# Patient Record
Sex: FEMALE | Race: WHITE | Employment: OTHER | URBAN - METROPOLITAN AREA
[De-identification: names, ages, dates, MRNs, and addresses within clinical notes are randomized per-mention and may not be internally consistent; named-entity substitution may affect disease eponyms.]

---

## 2017-08-16 ENCOUNTER — OFFICE VISIT (OUTPATIENT)
Dept: SURGERY | Age: 82
End: 2017-08-16

## 2017-08-16 DIAGNOSIS — L57.0 ACTINIC KERATOSIS: ICD-10-CM

## 2017-08-16 DIAGNOSIS — B07.9 VIRAL WARTS, UNSPECIFIED TYPE: ICD-10-CM

## 2017-08-16 DIAGNOSIS — L82.0 INFLAMED SEBORRHEIC KERATOSIS: ICD-10-CM

## 2017-08-16 DIAGNOSIS — R52 PAIN: ICD-10-CM

## 2017-08-16 DIAGNOSIS — Z85.828 HISTORY OF SKIN CANCER: Primary | ICD-10-CM

## 2017-08-16 PROCEDURE — 1090F PRES/ABSN URINE INCON ASSESS: CPT | Performed by: DERMATOLOGY

## 2017-08-16 PROCEDURE — 4040F PNEUMOC VAC/ADMIN/RCVD: CPT | Performed by: DERMATOLOGY

## 2017-08-16 PROCEDURE — 1123F ACP DISCUSS/DSCN MKR DOCD: CPT | Performed by: DERMATOLOGY

## 2017-08-16 PROCEDURE — G8421 BMI NOT CALCULATED: HCPCS | Performed by: DERMATOLOGY

## 2017-08-16 PROCEDURE — 1036F TOBACCO NON-USER: CPT | Performed by: DERMATOLOGY

## 2017-08-16 PROCEDURE — 17110 DESTRUCTION B9 LES UP TO 14: CPT | Performed by: DERMATOLOGY

## 2017-08-16 PROCEDURE — G8428 CUR MEDS NOT DOCUMENT: HCPCS | Performed by: DERMATOLOGY

## 2017-08-16 PROCEDURE — G8598 ASA/ANTIPLAT THER USED: HCPCS | Performed by: DERMATOLOGY

## 2017-08-16 PROCEDURE — 17000 DESTRUCT PREMALG LESION: CPT | Performed by: DERMATOLOGY

## 2017-08-16 PROCEDURE — 17003 DESTRUCT PREMALG LES 2-14: CPT | Performed by: DERMATOLOGY

## 2017-08-16 PROCEDURE — 99213 OFFICE O/P EST LOW 20 MIN: CPT | Performed by: DERMATOLOGY

## 2017-12-03 PROBLEM — R06.03 RESPIRATORY DISTRESS: Status: ACTIVE | Noted: 2017-01-01

## 2018-01-01 ENCOUNTER — APPOINTMENT (OUTPATIENT)
Dept: GENERAL RADIOLOGY | Age: 83
DRG: 285 | End: 2018-01-01
Payer: MEDICARE

## 2018-01-01 ENCOUNTER — OFFICE VISIT (OUTPATIENT)
Dept: SURGERY | Age: 83
End: 2018-01-01

## 2018-01-01 ENCOUNTER — HOSPITAL ENCOUNTER (INPATIENT)
Age: 83
LOS: 1 days | DRG: 285 | End: 2018-11-13
Attending: EMERGENCY MEDICINE | Admitting: INTERNAL MEDICINE
Payer: MEDICARE

## 2018-01-01 ENCOUNTER — APPOINTMENT (OUTPATIENT)
Dept: CT IMAGING | Age: 83
DRG: 285 | End: 2018-01-01
Payer: MEDICARE

## 2018-01-01 ENCOUNTER — TELEPHONE (OUTPATIENT)
Dept: CARDIOLOGY CLINIC | Age: 83
End: 2018-01-01

## 2018-01-01 VITALS
HEART RATE: 79 BPM | OXYGEN SATURATION: 96 % | SYSTOLIC BLOOD PRESSURE: 120 MMHG | DIASTOLIC BLOOD PRESSURE: 68 MMHG | BODY MASS INDEX: 19.14 KG/M2 | WEIGHT: 108.03 LBS | RESPIRATION RATE: 14 BRPM | TEMPERATURE: 98.5 F | HEIGHT: 63 IN

## 2018-01-01 DIAGNOSIS — L57.0 ACTINIC KERATOSIS: ICD-10-CM

## 2018-01-01 DIAGNOSIS — D48.5 NEOPLASM OF UNCERTAIN BEHAVIOR OF SKIN: ICD-10-CM

## 2018-01-01 DIAGNOSIS — L82.1 SEBORRHEIC KERATOSIS: Primary | ICD-10-CM

## 2018-01-01 DIAGNOSIS — B35.1 ONYCHOMYCOSIS: ICD-10-CM

## 2018-01-01 DIAGNOSIS — I21.3 ST ELEVATION MYOCARDIAL INFARCTION (STEMI), UNSPECIFIED ARTERY (HCC): Primary | ICD-10-CM

## 2018-01-01 DIAGNOSIS — L85.8 CUTANEOUS HORN: ICD-10-CM

## 2018-01-01 DIAGNOSIS — L65.9 ALOPECIA OF SCALP: ICD-10-CM

## 2018-01-01 LAB
ANION GAP SERPL CALCULATED.3IONS-SCNC: 14 MMOL/L (ref 3–16)
ANTI-XA UNFRAC HEPARIN: 0.18 IU/ML (ref 0.3–0.7)
ANTI-XA UNFRAC HEPARIN: 1.17 IU/ML (ref 0.3–0.7)
APTT: 34.3 SEC (ref 26–36)
BASOPHILS ABSOLUTE: 0.1 K/UL (ref 0–0.2)
BASOPHILS RELATIVE PERCENT: 0.4 %
BUN BLDV-MCNC: 26 MG/DL (ref 7–20)
CALCIUM IONIZED: 1.24 MMOL/L (ref 1.12–1.32)
CALCIUM SERPL-MCNC: 10.2 MG/DL (ref 8.3–10.6)
CHLORIDE BLD-SCNC: 103 MMOL/L (ref 99–110)
CO2: 26 MMOL/L (ref 21–32)
CO2: 26 MMOL/L (ref 21–32)
CREAT SERPL-MCNC: 1 MG/DL (ref 0.6–1.2)
EOSINOPHILS ABSOLUTE: 0 K/UL (ref 0–0.6)
EOSINOPHILS RELATIVE PERCENT: 0 %
GFR AFRICAN AMERICAN: >60
GFR AFRICAN AMERICAN: >60
GFR NON-AFRICAN AMERICAN: 52
GFR NON-AFRICAN AMERICAN: 52
GLUCOSE BLD-MCNC: 158 MG/DL (ref 70–99)
GLUCOSE BLD-MCNC: 162 MG/DL (ref 70–99)
HCT VFR BLD CALC: 41 % (ref 36–48)
HEMOGLOBIN: 13.6 G/DL (ref 12–16)
INR BLD: 0.97 (ref 0.86–1.14)
LV EF: 33 %
LVEF MODALITY: NORMAL
LYMPHOCYTES ABSOLUTE: 0.6 K/UL (ref 1–5.1)
LYMPHOCYTES RELATIVE PERCENT: 4.4 %
MAGNESIUM: 1.9 MG/DL (ref 1.8–2.4)
MCH RBC QN AUTO: 30.8 PG (ref 26–34)
MCHC RBC AUTO-ENTMCNC: 33.1 G/DL (ref 31–36)
MCV RBC AUTO: 93.1 FL (ref 80–100)
MONOCYTES ABSOLUTE: 1 K/UL (ref 0–1.3)
MONOCYTES RELATIVE PERCENT: 7.2 %
NEUTROPHILS ABSOLUTE: 12.5 K/UL (ref 1.7–7.7)
NEUTROPHILS RELATIVE PERCENT: 88 %
PDW BLD-RTO: 13.9 % (ref 12.4–15.4)
PERFORMED ON: ABNORMAL
PERFORMED ON: ABNORMAL
PLATELET # BLD: 130 K/UL (ref 135–450)
PMV BLD AUTO: 8.6 FL (ref 5–10.5)
POC ANION GAP: 14 (ref 10–20)
POC BUN: 25 MG/DL (ref 7–18)
POC CHLORIDE: 104 MMOL/L (ref 99–110)
POC CREATININE: 1 MG/DL (ref 0.6–1.2)
POC POTASSIUM: 3.8 MMOL/L (ref 3.5–5.1)
POC SAMPLE TYPE: ABNORMAL
POC SAMPLE TYPE: ABNORMAL
POC SODIUM: 144 MMOL/L (ref 136–145)
POC TROPONIN I: >35 NG/ML (ref 0–0.1)
POTASSIUM REFLEX MAGNESIUM: 4.1 MMOL/L (ref 3.5–5.1)
PRO-BNP: ABNORMAL PG/ML (ref 0–449)
PROTHROMBIN TIME: 11.1 SEC (ref 9.8–13)
RBC # BLD: 4.41 M/UL (ref 4–5.2)
SODIUM BLD-SCNC: 143 MMOL/L (ref 136–145)
TROPONIN: 6.3 NG/ML
TROPONIN: 7.53 NG/ML
TROPONIN: 8.43 NG/ML
WBC # BLD: 14.2 K/UL (ref 4–11)

## 2018-01-01 PROCEDURE — 99221 1ST HOSP IP/OBS SF/LOW 40: CPT | Performed by: NURSE PRACTITIONER

## 2018-01-01 PROCEDURE — 93005 ELECTROCARDIOGRAM TRACING: CPT | Performed by: EMERGENCY MEDICINE

## 2018-01-01 PROCEDURE — 4040F PNEUMOC VAC/ADMIN/RCVD: CPT | Performed by: DERMATOLOGY

## 2018-01-01 PROCEDURE — 1036F TOBACCO NON-USER: CPT | Performed by: DERMATOLOGY

## 2018-01-01 PROCEDURE — 6360000002 HC RX W HCPCS

## 2018-01-01 PROCEDURE — 6360000004 HC RX CONTRAST MEDICATION: Performed by: INTERNAL MEDICINE

## 2018-01-01 PROCEDURE — 83880 ASSAY OF NATRIURETIC PEPTIDE: CPT

## 2018-01-01 PROCEDURE — 6370000000 HC RX 637 (ALT 250 FOR IP): Performed by: EMERGENCY MEDICINE

## 2018-01-01 PROCEDURE — 99291 CRITICAL CARE FIRST HOUR: CPT | Performed by: INTERNAL MEDICINE

## 2018-01-01 PROCEDURE — 1200000000 HC SEMI PRIVATE

## 2018-01-01 PROCEDURE — 17000 DESTRUCT PREMALG LESION: CPT | Performed by: DERMATOLOGY

## 2018-01-01 PROCEDURE — 6370000000 HC RX 637 (ALT 250 FOR IP): Performed by: STUDENT IN AN ORGANIZED HEALTH CARE EDUCATION/TRAINING PROGRAM

## 2018-01-01 PROCEDURE — 93306 TTE W/DOPPLER COMPLETE: CPT

## 2018-01-01 PROCEDURE — 71045 X-RAY EXAM CHEST 1 VIEW: CPT

## 2018-01-01 PROCEDURE — 80047 BASIC METABLC PNL IONIZED CA: CPT

## 2018-01-01 PROCEDURE — 70498 CT ANGIOGRAPHY NECK: CPT

## 2018-01-01 PROCEDURE — 85018 HEMOGLOBIN: CPT

## 2018-01-01 PROCEDURE — 99214 OFFICE O/P EST MOD 30 MIN: CPT | Performed by: DERMATOLOGY

## 2018-01-01 PROCEDURE — 6360000002 HC RX W HCPCS: Performed by: EMERGENCY MEDICINE

## 2018-01-01 PROCEDURE — 99233 SBSQ HOSP IP/OBS HIGH 50: CPT | Performed by: INTERNAL MEDICINE

## 2018-01-01 PROCEDURE — 85730 THROMBOPLASTIN TIME PARTIAL: CPT

## 2018-01-01 PROCEDURE — 84484 ASSAY OF TROPONIN QUANT: CPT

## 2018-01-01 PROCEDURE — G8427 DOCREV CUR MEDS BY ELIG CLIN: HCPCS | Performed by: DERMATOLOGY

## 2018-01-01 PROCEDURE — G8598 ASA/ANTIPLAT THER USED: HCPCS | Performed by: DERMATOLOGY

## 2018-01-01 PROCEDURE — 1090F PRES/ABSN URINE INCON ASSESS: CPT | Performed by: DERMATOLOGY

## 2018-01-01 PROCEDURE — G8420 CALC BMI NORM PARAMETERS: HCPCS | Performed by: DERMATOLOGY

## 2018-01-01 PROCEDURE — 83735 ASSAY OF MAGNESIUM: CPT

## 2018-01-01 PROCEDURE — C8929 TTE W OR WO FOL WCON,DOPPLER: HCPCS

## 2018-01-01 PROCEDURE — 85610 PROTHROMBIN TIME: CPT

## 2018-01-01 PROCEDURE — APPSS45 APP SPLIT SHARED TIME 31-45 MINUTES: Performed by: NURSE PRACTITIONER

## 2018-01-01 PROCEDURE — 1101F PT FALLS ASSESS-DOCD LE1/YR: CPT | Performed by: DERMATOLOGY

## 2018-01-01 PROCEDURE — 99285 EMERGENCY DEPT VISIT HI MDM: CPT

## 2018-01-01 PROCEDURE — 96374 THER/PROPH/DIAG INJ IV PUSH: CPT

## 2018-01-01 PROCEDURE — 36415 COLL VENOUS BLD VENIPUNCTURE: CPT

## 2018-01-01 PROCEDURE — 70450 CT HEAD/BRAIN W/O DYE: CPT

## 2018-01-01 PROCEDURE — 2580000003 HC RX 258: Performed by: STUDENT IN AN ORGANIZED HEALTH CARE EDUCATION/TRAINING PROGRAM

## 2018-01-01 PROCEDURE — 1123F ACP DISCUSS/DSCN MKR DOCD: CPT | Performed by: DERMATOLOGY

## 2018-01-01 PROCEDURE — 70496 CT ANGIOGRAPHY HEAD: CPT

## 2018-01-01 PROCEDURE — 85025 COMPLETE CBC W/AUTO DIFF WBC: CPT

## 2018-01-01 PROCEDURE — 85520 HEPARIN ASSAY: CPT

## 2018-01-01 RX ORDER — HEPARIN SODIUM 10000 [USP'U]/100ML
11 INJECTION, SOLUTION INTRAVENOUS CONTINUOUS
Status: DISCONTINUED | OUTPATIENT
Start: 2018-01-01 | End: 2018-11-14 | Stop reason: HOSPADM

## 2018-01-01 RX ORDER — HEPARIN SODIUM 5000 [USP'U]/ML
60 INJECTION, SOLUTION INTRAVENOUS; SUBCUTANEOUS PRN
Status: DISCONTINUED | OUTPATIENT
Start: 2018-01-01 | End: 2018-11-14 | Stop reason: HOSPADM

## 2018-01-01 RX ORDER — HEPARIN SODIUM 5000 [USP'U]/ML
60 INJECTION, SOLUTION INTRAVENOUS; SUBCUTANEOUS ONCE
Status: COMPLETED | OUTPATIENT
Start: 2018-01-01 | End: 2018-01-01

## 2018-01-01 RX ORDER — HEPARIN SODIUM 10000 [USP'U]/100ML
INJECTION, SOLUTION INTRAVENOUS
Status: COMPLETED
Start: 2018-01-01 | End: 2018-01-01

## 2018-01-01 RX ORDER — ONDANSETRON 2 MG/ML
4 INJECTION INTRAMUSCULAR; INTRAVENOUS EVERY 6 HOURS PRN
Status: DISCONTINUED | OUTPATIENT
Start: 2018-01-01 | End: 2018-11-14 | Stop reason: HOSPADM

## 2018-01-01 RX ORDER — ASPIRIN 300 MG/1
300 SUPPOSITORY RECTAL DAILY
Status: DISCONTINUED | OUTPATIENT
Start: 2018-01-01 | End: 2018-11-14 | Stop reason: HOSPADM

## 2018-01-01 RX ORDER — ASPIRIN 300 MG/1
300 SUPPOSITORY RECTAL ONCE
Status: COMPLETED | OUTPATIENT
Start: 2018-01-01 | End: 2018-01-01

## 2018-01-01 RX ORDER — HEPARIN SODIUM 5000 [USP'U]/ML
30 INJECTION, SOLUTION INTRAVENOUS; SUBCUTANEOUS PRN
Status: DISCONTINUED | OUTPATIENT
Start: 2018-01-01 | End: 2018-11-14 | Stop reason: HOSPADM

## 2018-01-01 RX ORDER — HEPARIN SODIUM 5000 [USP'U]/ML
INJECTION, SOLUTION INTRAVENOUS; SUBCUTANEOUS
Status: DISPENSED
Start: 2018-01-01 | End: 2018-01-01

## 2018-01-01 RX ORDER — SODIUM CHLORIDE 0.9 % (FLUSH) 0.9 %
10 SYRINGE (ML) INJECTION PRN
Status: DISCONTINUED | OUTPATIENT
Start: 2018-01-01 | End: 2018-11-14 | Stop reason: HOSPADM

## 2018-01-01 RX ORDER — ACETAMINOPHEN 650 MG/1
650 SUPPOSITORY RECTAL EVERY 4 HOURS PRN
Status: DISCONTINUED | OUTPATIENT
Start: 2018-01-01 | End: 2018-11-14 | Stop reason: HOSPADM

## 2018-01-01 RX ORDER — SODIUM CHLORIDE 0.9 % (FLUSH) 0.9 %
10 SYRINGE (ML) INJECTION EVERY 12 HOURS SCHEDULED
Status: DISCONTINUED | OUTPATIENT
Start: 2018-01-01 | End: 2018-11-14 | Stop reason: HOSPADM

## 2018-01-01 RX ADMIN — HEPARIN SODIUM AND DEXTROSE 12 UNITS/KG/HR: 10000; 5 INJECTION INTRAVENOUS at 00:28

## 2018-01-01 RX ADMIN — HEPARIN SODIUM 3000 UNITS: 5000 INJECTION INTRAVENOUS; SUBCUTANEOUS at 00:24

## 2018-01-01 RX ADMIN — DEXTROSE AND SODIUM CHLORIDE: 5; 450 INJECTION, SOLUTION INTRAVENOUS at 16:46

## 2018-01-01 RX ADMIN — HEPARIN SODIUM 1500 UNITS: 5000 INJECTION INTRAVENOUS; SUBCUTANEOUS at 16:46

## 2018-01-01 RX ADMIN — IOPAMIDOL 80 ML: 755 INJECTION, SOLUTION INTRAVENOUS at 17:24

## 2018-01-01 RX ADMIN — ASPIRIN 300 MG: 300 SUPPOSITORY RECTAL at 00:22

## 2018-01-01 RX ADMIN — ASPIRIN 300 MG: 300 SUPPOSITORY RECTAL at 11:16

## 2018-01-01 RX ADMIN — HEPARIN SODIUM 12 UNITS/KG/HR: 10000 INJECTION, SOLUTION INTRAVENOUS at 00:28

## 2018-08-09 NOTE — PROGRESS NOTES
[facility]) Mohs Surgery and Cosmetic Dermatology  Lavon Lawson MD  [phone]      Redd Escalante  2/4/1930    80 y.o. female     Date of Visit: 8/8/2018    Chief Complaint:   Chief Complaint   Patient presents with    Other     Skin check, yearly gen derm        CC:   Crusted lesion on left third toe    History of Present Illness:  1. Pt's son c/o crusted lesion on left third toe. Pt has no complaints. No increase in size for the past year. Present for many years without change. No tx tried  2. C/o thickened left great toenail. Asx. No tx tried. 3. C/o skin lesion on left arm. Asx. No tx tried. Present for several months  4. C/o scaly lesion on nasal dorsum, at least several months, asx, no tx tried. 5. C/o hair loss. Gradual thinning. No clumps of hair falling out. No bald patches. No itching or redness of scalp. According to son, pt has had thyroid checked and no disturbances. Results from 12/17 wnl  6. C/o discoloration on lower legs for several years, possibly worsening. No tx tried. No h/o sig edema. History of skin cancer:  Yes, NMSC  Family history of Melanoma: none    Review of Systems:  Constitutional: Reports general sense of well-being. Skin: Denies any other new or changing moles. no tendency to develop thick scars. Heme: no abnormal bleeding/bruising. Past Medical History:   Diagnosis Date    Altered mental status 4/28/2015    Assoc w/ intercurent illness: UTI with >10,000 CFU Klebsiella resistant to nitrofurantoin seen on 4/17/15 urine C&S done at [facility] (where [facility]  by Will Moore MD was done on 4/22/15 revealing benign endometrial hyperplasia without atypia) is fully sensitive to cipro. This Klebsiella is certainly etiologic w/ regard to her current UTI 4/27/15.     Aphasia 7/5/2016    Lack of verbal responsiveness on equivocally new since outpatient exam Thursday 6/30/16 when patient was fluently conversant (though still demented, verbal interaction during raised papule - son reports a recent trauma there. Assessment and Plan       1. Seborrheic keratosis toe: Counseling:  Seborrheic keratosis are benign. No treatment is necessary. Treatment options include liquid nitrogen, electrodessication and shave removal.  Expectations:  Seborrheic keratosis are benign growths. They tend to increase in number with age. 2. Onychomycosis:  Given asx nature, would not recommend tx.  3. Cutaneous horn left arm:  Low likelihood of underlying nmsc given appearance. Monitor lesion and if increase in size, would recommend bx.  4. Actinic keratosis nose:   Liquid nitrogen cryotherapy for actinic keratosis X 1 on nasal dorsum. After risk associated with the procedure including but not limited to pain, bleeding, infection, blister formation, dyspigmention, scar formation and possible need for another treatment at a later date discussed with the patient, verbal (or written) consent was obtained with teach back. Area was treated with liquid nitrogen cryotherapy in 2 freeze-thaw cycles to 1 lesion(s). Patient tolerated the procedure well, there were no immediate complications, no blood loss, after care discussed with the patient. Patient left the unit in stable good medical condition. Patient will follow up as needed. 5. Alopecia: hair seems brittle. No pathologic condition identified. Likely age related. 6. Neoplasm of uncertain behavior: Lesion on left leg - recent trauma. If persists, rtc for reevaluation.

## 2018-11-13 PROBLEM — I21.02 ST ELEVATION MYOCARDIAL INFARCTION INVOLVING LEFT ANTERIOR DESCENDING (LAD) CORONARY ARTERY (HCC): Status: ACTIVE | Noted: 2018-01-01

## 2018-11-13 PROBLEM — I21.3 STEMI (ST ELEVATION MYOCARDIAL INFARCTION) (HCC): Status: ACTIVE | Noted: 2018-01-01

## 2018-11-13 PROBLEM — I25.3 ANEURYSM OF LEFT VENTRICLE OF HEART: Status: ACTIVE | Noted: 2018-01-01

## 2018-11-13 PROBLEM — I50.21 ACUTE SYSTOLIC CONGESTIVE HEART FAILURE (HCC): Status: ACTIVE | Noted: 2018-01-01

## 2018-11-13 NOTE — ED TRIAGE NOTES
Patient is an 80year old female who presents to the ED with her son from home with reports of altered mental status and emesis. Son reports patient has not been responsive today. EKG obtained; code STEMI called; MD at bedside. PIV placed, lab work drawn and patient placed on cardiac monitor.

## 2018-11-13 NOTE — CONSULTS
suspected since 2012: Dementia. Ms. Gomes's neurologic exam shows evidence of substa    Fracture of pelvis ([address] 05/2012    R side    Heart murmur     HTN (hypertension) 12/13/2012    Hyperlipidemia LDL goal <70 7/5/2012    Secondary atherosclerosis prevention lipid therapy LDL goal <70 as part of cardiovascular risk modification in an effort to prevent recurrent CVA, preserve cognitive function by preventing multi-infarct dementia from worsening.  Labyrinthitis 03/2001    w/ vertigo, later R hand weakness    Recurrent UTI (urinary tract infection) 4/28/2015    UTI with >10,000 CFU Klebsiella resistant to nitrofurantoin seen on 4/17/15 urine C&S done at [facility] [place] (where [facility]  by Charis Knapp MD was done on 4/22/15 revealing benign endometrial hyperplasia without atypia) is fully sensitive to cipro. This Klebsiella is certainly etiologic w/ regard to her current UTI 4/27/15. 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 Peguero [facility]   [address] [facility]  06/2001    extensive n. release surg w/ Dr Memo Pringle @ [facility]. R Hand weakness coincided w/ viral labyrinthitis illness 03.2001    OTHER SURGICAL HISTORY Right 09/2010    excision ankle granuloma.  Dr. Tyron Boone  08/2001       Current Medications:    Current Facility-Administered Medications: heparin (porcine) injection 3,000 Units, 60 Units/kg, Intravenous, PRN  heparin (porcine) injection 1,500 Units, 30 Units/kg, Intravenous, PRN  heparin 25,000 units in dextrose 5% 250 mL infusion, 9 Units/kg/hr, Intravenous, Continuous  sodium chloride flush 0.9 % injection 10 mL, 10 mL, Intravenous, 2 times per day  sodium chloride flush 0.9 % injection 10 mL, 10 mL, Intravenous, PRN  magnesium hydroxide (MILK OF MAGNESIA) 400 MG/5ML suspension 30 mL, 30 mL, Oral, Daily PRN  ondansetron (ZOFRAN) injection 4 mg, 4 mg, Intravenous, Q6H PRN  acetaminophen (TYLENOL) suppository 650 mg, 650 mg, Rectal, Q4H PRN  aspirin suppository 300 mg, 300 mg, Rectal, Daily  [START ON 11/14/2018] influenza quadrivalent split vaccine (FLUZONE;FLUARIX;FLULAVAL;AFLURIA) injection 0.5 mL, 0.5 mL, Intramuscular, Once    Allergies:  Patient has no known allergies. Social History:    Patient currently lives with family ()    Review of Systems -   Patient lying with eyes closed    Objective:        PHYSICAL EXAM:    General appearance: appears stated age, cooperative, no distress, pale and resting  Head: Normocephalic, without obvious abnormality, atraumatic  Lungs: RR WNL, currently on 4 L NC  Heart: regular rate and rhythm  Abdomen: flat  Neurologic: SIERRA    Palliative Performance Scale:  60% ? Ambulation reduced; Significant disease; Can't do hobbies/housework; intake normal   or reduced; occasional assist; LOC full/confusion  50% ? Mainly sit/lie; Extensive disease; Can't do any work; Considerable assist; intake normal  Or reduced; LOC full/confusion  40% ? Mainly in bed; Extensive disease; Mainly assist; intake normal or reduced; occasional assist; LOC full/confusion  30% ? Bed Bound; Extensive disease; Total care; intake reduced; LOC full/confusion  20% ? Bed Bound; Extensive disease; Total care; intake minimal; Drowsy/coma  10% ? Bed Bound; Extensive disease; Total care; Mouth care only; Drowsy/coma  0 ?  Death    PPS: 30%    Vitals:    /72   Pulse 77   Temp 98 °F (36.7 °C) (Axillary)   Resp 13   Ht 5' 3\" (1.6 m)   Wt 108 lb 0.4 oz (49 kg)   SpO2 96%   BMI 19.14 kg/m²     DATA:    CBC with Differential:    Lab Results   Component Value Date    WBC 14.2 11/13/2018    RBC 4.41 11/13/2018    RBC 4.11 11/20/2015    HGB 13.6 11/13/2018    HCT 41.0 11/13/2018     11/13/2018    MCV 93.1 11/13/2018    MCH 30.8 11/13/2018    MCHC 33.1

## 2018-11-13 NOTE — PROGRESS NOTES
Patient's son defers transferring patient to specialty bed at this time, would like to wait until daytime.

## 2018-11-13 NOTE — CONSULTS
appropriate, to  consider a laboratory study and make a decision about doing cardiac  angiogram and intervention. Critical care time spent is 45 minutes.         Jaron John MD    D: 11/13/2018 1:35:34       T: 11/13/2018 4:40:16     [email or web address]  Job#: [id number]     Doc#: [id number]    CC:  MD Chika Edwards MD

## 2018-11-13 NOTE — H&P
without atypia) is fully sensitive to cipro. This Klebsiella is certainly etiologic w/ regard to her current UTI 4/27/15.  Aphasia 7/5/2016    Lack of verbal responsiveness on equivocally new since outpatient exam Thursday 6/30/16 when patient was fluently conversant (though still demented, verbal interaction during outpatient exam Thursday 6/30/16 consisted of pleasant verbal interaction though Mrs Gomes's responses to questions were inappropriate as she is not aware as to time, place, situation. She is only consistently oriented to pe    Breast CA ([facility] [address] 02/2008    Left, ER/ID +, HER-2/cl[place] neg. Dr. 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 Srikanth Marvin is not safely ambulatory at baseline, requiring assistance often in getting about her dwelling using walker, cane or sturdy objects in the home to stabilize her ambulation. Also NOT ORIENTED AT BASELINE TO PLACE TIME OR SITUATION. 5/7/13  Neurology Consult Polina Herron MD confirms presence of dementia suspected since 2012: Dementia. Ms. Gomes's neurologic exam shows evidence of substa    Fracture of pelvis ([facility] [address] 05/2012    R side    Heart murmur     HTN (hypertension) 12/13/2012    Hyperlipidemia LDL goal <70 7/5/2012    Secondary atherosclerosis prevention lipid therapy LDL goal <70 as part of cardiovascular risk modification in an effort to prevent recurrent CVA, preserve cognitive function by preventing multi-infarct dementia from worsening.  Labyrinthitis 03/2001    w/ vertigo, later R hand weakness    Recurrent UTI (urinary tract infection) 4/28/2015    UTI with >10,000 CFU Klebsiella resistant to nitrofurantoin seen on 4/17/15 urine C&S done at [facility] (where [facility]  by Teresa Deleon MD was done on 4/22/15 revealing benign endometrial hyperplasia without atypia) is fully sensitive to cipro.  This Klebsiella is

## 2018-11-13 NOTE — PROGRESS NOTES
Patient arrived to room 4462 via bed, transferred to bed in room. Patient placed on telemetry, vitals stable, son at bedside, 4eye assessment complete.

## 2018-11-13 NOTE — CONSULTS
atorvastatin (LIPITOR) 40 MG tablet 1 tab daily for cholesterol and stroke prevention. DO NOT CHANGE SIG 7/8/16  Yes Bonnie Tello MD   atenolol (TENORMIN) 50 MG tablet 1 tab daily for blood pressure. DO NOT CHANGE SIG 7/8/16  Yes Bonnie Tello MD   amLODIPine (NORVASC) 5 MG tablet 1 TAB daily for FOR BLOOD PRESSURE.  (DO NOT CHANGE SIG) 7/8/16  Yes Bonnie Tello MD   conjugated estrogens (PREMARIN) vaginal cream Place 1 g vaginally daily. Place vaginally daily. Yes Historical Provider, MD   Folic Acid-Vit L9-ZAQ H90 (FOLPLEX 2.2) 2.2-25-0.5 MG TABS 2 tablets daily, vitamins for memory. DO NOT CHANGE SIG 7/8/16   Bonnie Tello MD   alendronate (FOSAMAX) 70 MG tablet 1 tablet weekly on an empty stomach for bones. DO NOT CHANGE SIG 7/8/16   Bonnie Tello MD   losartan-hydrochlorothiazide [facility]) 100-25 MG per tablet TAKE 1 TABLET BY MOUTH DAILY FOR BLOOD PRESSURE 9/17/14   Bonnie Tello MD   calcium citrate-vitamin D (CITRICAL + D) 315-250 MG-UNIT TABS Take 1 tablet by mouth 2 times daily.       Historical Provider, MD          Current Facility-Administered Medications   Medication Dose Route Frequency Provider Last Rate Last Dose    heparin (porcine) injection 3,000 Units  60 Units/kg Intravenous PRN Alfredia Dancer, MD        heparin (porcine) injection 1,500 Units  30 Units/kg Intravenous PRN Alfredia Dancer, MD        heparin 25,000 units in dextrose 5% 250 mL infusion  9 Units/kg/hr Intravenous Continuous Bonnie Tello MD 4.5 mL/hr at 11/13/18 0725 9 Units/kg/hr at 11/13/18 0725    sodium chloride flush 0.9 % injection 10 mL  10 mL Intravenous 2 times per day Jay Velasquez MD        sodium chloride flush 0.9 % injection 10 mL  10 mL Intravenous PRN Jay Velasquez MD        magnesium hydroxide (MILK OF MAGNESIA) 400 MG/5ML suspension 30 mL  30 mL Oral Daily PRN Jay Velasquez MD        ondansetron [facility]) injection 4 mg  4 mg Intravenous Q6H PRN Jay Velasquez MD        acetaminophen (TYLENOL) suppository 650 mg  650 mg Rectal Q4H PRN Genie Vila MD        aspirin suppository 300 mg  300 mg Rectal Daily Genie Vila MD       [facility] [START ON 11/14/2018] influenza quadrivalent split vaccine (FLUZONE;FLUARIX;FLULAVAL;AFLURIA) injection 0.5 mL  0.5 mL Intramuscular Once Magdalena Machado MD         Allergies:  Patient has no known allergies. · ROS:   · Cardiovascular: Reviewed in HPI  · Allergic/Immunologic: No nasal congestion or hives. · All other ROS are reviewed and are unremarkable. Vitals:    11/13/18 0349 11/13/18 0700 11/13/18 0800 11/13/18 0900   BP: 132/68 125/72 121/79 120/72   Pulse: 71 76 73 77   Resp: 16 14 15 13   Temp: 98.1 °F (36.7 °C)  98 °F (36.7 °C)    TempSrc:   Axillary    SpO2:       Weight:       Height:            Constitutional and General Appearance:  Skin neg     HEENT: eyes and ears intact. THYROID: not enlarged  LUNGS decreased post bases   HEART and VASCULAR: RRR  EXTREMITIES  ecchymosis <1+ edema   ABDOMEN:neg  Neuro neg  Psych neg       Assessment    Acute ant septal MI   She has been assessed by Dr. Jacky Alan per his note  \"Spoke with patient's son this morning at bedside. He is requesting a second opinion from cardiology regarding the cardiac cath for her STEMI. Dr. Jacky Alan decided last night that risk-benefit of reperfusion strongly favors conservative management. She was started on a heparin gtt and ASA. Dr. Jacky Alan had multiple conversations with the son, Dr. Madelin Huber (PCP) and cardiology. However, this morning the son is requesting a second opinion from a different cardiologist\".      POC trop >35.00 Trop 8.43>7.53>6.30   Pro BNP 12,558   WBC 14.2   EKG ant septal MI   Echo P     Hx HTN  Optimal    Hx CVA    Hx breast CA  & squamous CA    Plan   Acute ant septal MI, no verbal response from pt / vitals stable afebrile SV02 96% on 4l 02   Magnesium level this am   Echo pending /  echo tech states appears EF to be 30% mid to apical hypokinesis    Son here and at this

## 2018-11-13 NOTE — PLAN OF CARE
Problem: Risk for Impaired Skin Integrity  Goal: Tissue integrity - skin and mucous membranes  Structural intactness and normal physiological function of skin and  mucous membranes.      Intervention: TURN PATIENT  Pt turned every 2 hours to promote skin integrety

## 2018-11-14 LAB
EKG ATRIAL RATE: 73 BPM
EKG DIAGNOSIS: NORMAL
EKG P AXIS: 48 DEGREES
EKG P-R INTERVAL: 178 MS
EKG Q-T INTERVAL: 410 MS
EKG QRS DURATION: 82 MS
EKG QTC CALCULATION (BAZETT): 451 MS
EKG R AXIS: -39 DEGREES
EKG T AXIS: 23 DEGREES
EKG VENTRICULAR RATE: 73 BPM
HEMOGLOBIN: 13.9 G/DL (ref 12–16)

## 2018-11-14 PROCEDURE — 6360000004 HC RX CONTRAST MEDICATION: Performed by: INTERNAL MEDICINE

## 2018-11-14 RX ADMIN — PERFLUTREN 2.2 MG: 6.52 INJECTION, SUSPENSION INTRAVENOUS at 08:15

## 2018-11-21 NOTE — PROGRESS NOTES
[facility] [employer]. Internal Medicine Discharge /Death Summary      PCP: Tami Dixon MD     Date of Admission: 11/13/18  Discharge /Death Date: 11/13/18     Date ofService: Pt seen/examined on 11/13/18 and Admitted to Inpatient with expected LOS greater than two midnights dueto medical therapy.      Chief Complaint:  Altered mental status; emesis      History Of Present Illness: Wellington Orozco is an 81yo MWF with PMH of HTN, stroke, urinary incontinence, recurrent UTI, basal cell cancer, SCC of the skin, breast cancer s/p lumpectomy (2009), hyperammonemia and dementia at baseline [facility] by her son on 11/13/18 due to altered mental status. According to patient's son, she began to display progressively decreased responsiveness starting Sunday. Yesterday, the patient appeared unwell and began vomiting. Patient's son/ caregiver Karthik Mujica ([employer]) reports that she is normally conversant and verbal at baseline. He decided to bring her into the hospital earlier this evening when she stopped responding to him.      In the ED, the patient was afebrile but lethargic with a leukocytosis of 14.2. POC troponin >35 massively elevated (highest ever seen by Cardiologist SUNI Perez MD, Internist Jesus Hough MD). Laboratory troponin was 8.43. Pro-BNP was drawn and was 12,558. Patient's EKG showed ST-segment elevation in leads V2, V3 and V4 as well as Q waves. Code STEMI was called and patient was started on a heparin drip. Interventional cardiology was consulted and decided against catheterization as the risks of reperfusion would likely outweigh its' benefits. Patient is admitted to medicine for conservative management.      Past Medical History:    Past Medical History             Diagnosis Date    Altered mental status 4/28/2015     Assoc w/ intercurent illness: UTI with >10,000 CFU Klebsiella resistant to nitrofurantoin seen on 4/17/15 urine C&S done at [facility]  by 315-250 MG-UNIT TABS Take 1 tablet by mouth 2 times daily.         Historical Provider, MD   conjugated estrogens (PREMARIN) vaginal cream Place 1 g vaginally daily. Place vaginally daily.        Historical Provider, MD         Allergies: Patient has no known allergies.     Social History:  The patient currently lives in a private residence with . Son lives next door. TOBACCO:   reports that she has never smoked. She has never used smokeless tobacco.  ETOH:   reports that she does not drink alcohol.     Family History:  Reviewed in detail and negative for DM, CAD, Cancer, CVA. Positive as follows:  Family History         Family History   Problem Relation Age of Onset    Heart Failure Father      Stroke Mother      Other Mother           PVD    Heart Attack Brother 62    Heart Failure Brother      Other Sister           SVT    Hypertension Brother      Hypertension Daughter           REVIEW OF SYSTEMS:   Pertinent positives as noted in theHPI. All other systems reviewed and negative. ROS: Review of Systems   Unable to perform ROS: Acuity of condition      PHYSICAL EXAM PERFORMED:  BP (!) 131/114   Pulse 77   Temp 98.5 °F (36.9 °C) (Axillary)   Resp 17   Ht 5' 3\" (1.6 m)   Wt 110 lb (49.9 kg)   SpO2 99%   BMI 19.49 kg/m²   General appearance:  Obtunded elderly female. HEENT:  Normal cephalic, atraumatic without obviousdeformity. Pupils equal, round, and reactive to light. Extra ocular muscles intact. Conjunctivae/corneas clear. Neck: Supple, with full range of motion. No jugular venous distention. Trachea midline. Respiratory:Normal respiratory effort. Clear to auscultation, bilaterally without Rales/Wheezes/Rhonchi. Cardiovascular:Regular rate and rhythm with normal S1/S2 without murmurs, rubs or gallops. Abdomen: Soft, non-tender,non-distended with normal bowel sounds. Musculoskeletal:  No clubbing, cyanosis or edema bilaterally. Fullrange of motion without deformity.   Skin: Skin aspirin   -Supplemental O2 as needed   -PRN morphine for pain   -Daily CBC and CMP   -Neurovascular checks   -Consult palliative care; appreciate recommendations   -Spoke with patient's son regarding code status. States she is DNR-CC and would not want any further aggressive interventions.      HTN  -On atenolol, amlodipine and losartan-HCTZ at home  -Hold home meds due to obtunded state and likely inability to swallow     DVT Prophylaxis: Heparin infusion   Diet: NPO  Code Status: Prior     PT/OT Eval Status: Consulted      Dispo - General medicine/surgery floors      11/13/18 Taisha Dupont ([birth year])  Mrs. Cassandra Nguyen is seen in her room with Dr. Danni Munguia, son [facility] simi 11/13/18 AM. Rebekah Diaz with patient's son this morning at bedside. He is requesting a second opinion from cardiology regarding the cardiac cath for her STEMI. Dr. Leslye Aquino decided last night that risk-benefit of reperfusion strongly favors conservative management. She was started on a heparin gtt and ASA. Dr. Leslye Aquino had multiple conversations with the son, Dr. Paulo Kuo (PCP) and cardiology. However, this morning the son is requesting a second opinion from a different cardiologist. Cardiology consultation 11/13/18 by Duane Benavides MD including detailed, lengthy discussion with son/ caregiver Mr Ira Garcia much appreciated. Decreased LVEF 30-35% resulting in global brain hypoperfusion accounts for diminished mental status.     Troponins 6.30 at 846 AM remain hugely elevated. The patient is not interacting to verbal stimuli much, which is different from & below her baseline. 11/13/18 Head CT without new pathology. 11/13/18 Echo 30-35% LVEF, akinesis distal anterior, apical, septal and inferior walls consistent with LAD AMI. Septum & apex appear aneurysmal.     Mrs. Gomes's later demise 11/13/18 at 46 PM discussed 11/13/18 PM with devoted son Ira Garcia [phone].     Bud Heredia, PGY-1 / Fadumo Velasquez -[phone], back line [phone]